# Patient Record
Sex: FEMALE | Race: BLACK OR AFRICAN AMERICAN | NOT HISPANIC OR LATINO | URBAN - METROPOLITAN AREA
[De-identification: names, ages, dates, MRNs, and addresses within clinical notes are randomized per-mention and may not be internally consistent; named-entity substitution may affect disease eponyms.]

---

## 2020-07-01 ENCOUNTER — OFFICE (OUTPATIENT)
Dept: URBAN - METROPOLITAN AREA CLINIC 11 | Facility: CLINIC | Age: 85
End: 2020-07-01
Payer: MEDICAID

## 2020-07-01 VITALS
HEIGHT: 67 IN | SYSTOLIC BLOOD PRESSURE: 144 MMHG | WEIGHT: 236 LBS | DIASTOLIC BLOOD PRESSURE: 67 MMHG | HEART RATE: 72 BPM

## 2020-07-01 DIAGNOSIS — R10.33 PERIUMBILICAL PAIN: ICD-10-CM

## 2020-07-01 DIAGNOSIS — K59.00 CONSTIPATION, UNSPECIFIED: ICD-10-CM

## 2020-07-01 DIAGNOSIS — J44.9 CHRONIC OBSTRUCTIVE PULMONARY DISEASE, UNSPECIFIED: ICD-10-CM

## 2020-07-01 DIAGNOSIS — R14.0 ABDOMINAL DISTENSION (GASEOUS): ICD-10-CM

## 2020-07-01 PROCEDURE — 99204 OFFICE O/P NEW MOD 45 MIN: CPT | Performed by: INTERNAL MEDICINE

## 2021-03-06 ENCOUNTER — INPATIENT HOSPITAL (OUTPATIENT)
Dept: URBAN - METROPOLITAN AREA HOSPITAL 130 | Facility: HOSPITAL | Age: 86
End: 2021-03-06
Payer: MEDICAID

## 2021-03-06 DIAGNOSIS — R93.5 ABNORMAL FINDINGS ON DIAGNOSTIC IMAGING OF OTHER ABDOMINAL R: ICD-10-CM

## 2021-03-06 DIAGNOSIS — N28.9 DISORDER OF KIDNEY AND URETER, UNSPECIFIED: ICD-10-CM

## 2021-03-06 DIAGNOSIS — J44.9 CHRONIC OBSTRUCTIVE PULMONARY DISEASE, UNSPECIFIED: ICD-10-CM

## 2021-03-06 PROCEDURE — 99222 1ST HOSP IP/OBS MODERATE 55: CPT | Performed by: INTERNAL MEDICINE

## 2021-03-07 ENCOUNTER — INPATIENT HOSPITAL (OUTPATIENT)
Dept: URBAN - METROPOLITAN AREA HOSPITAL 130 | Facility: HOSPITAL | Age: 86
End: 2021-03-07
Payer: MEDICAID

## 2021-03-07 DIAGNOSIS — K92.2 GASTROINTESTINAL HEMORRHAGE, UNSPECIFIED: ICD-10-CM

## 2021-03-07 DIAGNOSIS — K57.33 DIVERTICULITIS OF LARGE INTESTINE WITHOUT PERFORATION OR ABS: ICD-10-CM

## 2021-03-07 PROCEDURE — 99231 SBSQ HOSP IP/OBS SF/LOW 25: CPT | Performed by: INTERNAL MEDICINE

## 2021-03-08 ENCOUNTER — INPATIENT HOSPITAL (OUTPATIENT)
Dept: URBAN - METROPOLITAN AREA HOSPITAL 130 | Facility: HOSPITAL | Age: 86
End: 2021-03-08
Payer: MEDICAID

## 2021-03-08 DIAGNOSIS — K57.33 DIVERTICULITIS OF LARGE INTESTINE WITHOUT PERFORATION OR ABS: ICD-10-CM

## 2021-03-08 PROCEDURE — 99231 SBSQ HOSP IP/OBS SF/LOW 25: CPT | Performed by: INTERNAL MEDICINE

## 2022-01-19 ENCOUNTER — OFFICE (OUTPATIENT)
Dept: URBAN - METROPOLITAN AREA CLINIC 11 | Facility: CLINIC | Age: 87
End: 2022-01-19
Payer: MEDICAID

## 2022-01-19 VITALS
DIASTOLIC BLOOD PRESSURE: 67 MMHG | HEART RATE: 72 BPM | OXYGEN SATURATION: 90 % | SYSTOLIC BLOOD PRESSURE: 152 MMHG | HEIGHT: 67 IN

## 2022-01-19 DIAGNOSIS — K64.8 OTHER HEMORRHOIDS: ICD-10-CM

## 2022-01-19 DIAGNOSIS — K57.30 DIVERTICULOSIS OF LARGE INTESTINE WITHOUT PERFORATION OR ABS: ICD-10-CM

## 2022-01-19 PROCEDURE — 99214 OFFICE O/P EST MOD 30 MIN: CPT

## 2022-01-19 NOTE — SERVICEHPINOTES
Ms. Olson is a 86 yr old female  who presents today with new onset of rectal bleeding, rectal pain and hemorrhoids.  She states that she 1st noticed rectal bleeding 3 weeks ago.  She states she had a few episodes of rectal bleeding which have resolved.  Her daughter thought she was having bleeding from hemorrhoids so she started using hydrocortisone suppositories which stopped the bleeding.  She also use hydrocortisone cream externally.   The patient states he has not notice bleeding for the past 2 weeks but now has rectal pain with bowel movements.  She states she will have 1-2 pasty bowel movements a day.  She has had a new onset of fecal incontinence which started in the last 3 weeks.  She denies any black tarry stool and is currently not taking any blood thinners.  She does have a history of diverticulitis.  her daughter stated that the rectal bleeding started after she had eaten popcorn the night before.  The daughter was questioning if her symptoms could be due to diverticulitis.   She currently denies any abdominal pain.   She does not take any fiber or stool softeners.   She states she can feel but "bulge" whenever she is wiping after a bowel movement.   She denies any straining or sitting for long periods of time will have a bowel movement.   She does have a history of COPD and wears oxygen.  She is able to walk but uses a wheelchair frequently.   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 
br
brPrevious GI History:Naomi was admitted to [facility] at the end of December 2014 because of hematochezia where she was seen by Dr. Moralez. EGD at that time revealed a moderate sized hiatal hernia and some chronic gastritis. Her bleeding stopped and she was seen by me in follow-up in January 2015. At that time her hematocrit was 33. She underwent colonoscopy in February 2015 which revealed moderate diverticulosis of the ascending and transverse colon and severe diverticulosis of descending and sigmoid colon. 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 The patient then came back earlier in 2016 because of some worsening diarrhea. Workup for this included negative blood work, abdominal film, and stool studies for C. difficile. We did a flexible sigmoidoscopy that was normal with normal biopsies. She was treated with Xifaxan since then she states her symptoms resolved. She has been last seen in clinic in 2016. She comes in because over the past year she has been having some more issues with abdominal distention and “swollen abdomen”. She states it is usually slightly more flat first thing in the morning but then gets bigger throughout the day. She does note some mild periumbilical discomfort associated with but no significant pain. She states that certain foods such as dairy can make it worse. She denies any problems with any hard bowel movements but states that she does have 1-2 small to medium bowel movements a day. She does not take anything for constipation. She has been having some more issues with shortness of breath and COPD and is on home oxygen 24 hours a day. She also has been scheduled to see a cardiologist because of worsening shortness of breath. She has been having some weight gain. She does see Nephrology due to some chronic kidney disease. She has never had abdominal surgery. She does have diabetes.

## 2022-01-19 NOTE — SERVICENOTES
patient presents today with complaints of rectal bleeding and pain.  She had a few episodes of rectal bleeding 3 weeks ago which resolved after taking hydrocortisone suppositories.  She states she is now having rectal pain with bowel movements and after sitting for long period time.  She does use a wheelchair a majority of the time.  her daughter states that she had 2 episodes of incontinence which is unlike her.   She denies straining or sitting on the toilet for a long period time.  She states her bowel movements now are more pasty like.  She does not take any stool softeners or fiber.  She does have a history of diverticulitis but denies any abdominal pain.   Upon her rectal exam today,  she did not have any external hemorrhoids present.  She denies any pain upon digital rectal exam.  Brown stool was present with no indication of bleeding.  Small internal hemorrhoids could be felt with rectal exam.  I prescribed her a hydrocortisone and lidocaine suppository to help with the pain.  I instructed her to start taking fiber and also a stool softener to help with her bowel movements and incontinence.  I discussed with her daughter that Sitz baths could help so she stated they would try that to.  Patient was instructed to  informed me if her symptoms have not resolved in the next 1-2 weeks.

## 2022-10-06 ENCOUNTER — OFFICE (OUTPATIENT)
Dept: URBAN - METROPOLITAN AREA CLINIC 11 | Facility: CLINIC | Age: 87
End: 2022-10-06
Payer: MEDICAID

## 2022-10-06 VITALS
DIASTOLIC BLOOD PRESSURE: 95 MMHG | HEIGHT: 67 IN | WEIGHT: 218 LBS | RESPIRATION RATE: 18 BRPM | SYSTOLIC BLOOD PRESSURE: 140 MMHG | HEART RATE: 56 BPM | OXYGEN SATURATION: 98 %

## 2022-10-06 DIAGNOSIS — Z79.1 LONG TERM (CURRENT) USE OF NON-STEROIDAL ANTI-INFLAMMATORIES: ICD-10-CM

## 2022-10-06 DIAGNOSIS — K62.5 HEMORRHAGE OF ANUS AND RECTUM: ICD-10-CM

## 2022-10-06 DIAGNOSIS — I50.9 HEART FAILURE, UNSPECIFIED: ICD-10-CM

## 2022-10-06 DIAGNOSIS — J44.9 CHRONIC OBSTRUCTIVE PULMONARY DISEASE, UNSPECIFIED: ICD-10-CM

## 2022-10-06 PROCEDURE — 99214 OFFICE O/P EST MOD 30 MIN: CPT | Performed by: INTERNAL MEDICINE

## 2022-10-06 NOTE — SERVICENOTES
I had a long conversation with the patient, daughter, and granddaughter.  We did discuss that it causes for the bleeding could be anything from polyps, AVMs, mass, colitis, etc.  Given the persistent longstanding issues over the past year it is reasonable that she undergo at least flexible sigmoidoscopy if not colonoscopy, however the patient states that she is not interested in colonoscopy is all because she does not want to be put to sleep due to her significant COPD, CHF, and other comorbidities, this is reasonable.  We did discuss the possibility of un sedated flexible sigmoidoscopy which she is more minimal to but wants to discuss further with her daughters.  They were encouraged to call back if they would like us to schedule this.  If she were to need flexible sigmoidoscopy then she would likely need GoLYTELY preparation.

## 2022-10-06 NOTE — SERVICEHPINOTES
Ms. Olson is an 87-year-old female here for evaluation of rectal bleeding. The the patient was seen by my associate in January 2022 because of some issues with hemorrhoids. At that time she was having some intermittent red blood per rectum and was using over-the-counter hydrocortisone cream. Rectal exam at that time revealed normal brown stool with no evidence of any bleeding. At that time endoscopic evaluation was discussed the patient was not interested. She has done relatively well since then but apparently has had some off and on red blood per rectum. Because of some larger volume clumps of red blood she recently went to [facility]. At that time blood work revealed normal hemoglobin of 12.7 and hematocrit 39.3. Platelet count was normal. She was apparently seen by GI there and discuss the possibility of colonoscopy but the patient refused and so the patient was discharged.  the patient, daughter, and granddaughter stated that she will have some intermittent red blood for around two or three days and then will not have any for a while. She denies any fevers or chills. There is no abdominal pain or weight loss. She has been having some more shortness of breath and also some leg swelling.Previous GI History:Naomi was admitted to [facility] at the end of December 2014 because of hematochezia where she was seen by Dr. Moralez. EGD at that time revealed a moderate sized hiatal hernia and some chronic gastritis. Her bleeding stopped and she was seen by me in follow-up in January 2015. At that time her hematocrit was 33. She underwent colonoscopy in February 2015 which revealed moderate diverticulosis of the ascending and transverse colon and severe diverticulosis of descending and sigmoid colon. 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 The patient then came back earlier in 2016 because of some worsening diarrhea. Workup for this included negative blood work, abdominal film, and stool studies for C. difficile. We did a flexible sigmoidoscopy that was normal with normal biopsies. She was treated with Xifaxan since then she states her symptoms resolved.